# Patient Record
Sex: MALE | Race: BLACK OR AFRICAN AMERICAN | Employment: FULL TIME | URBAN - METROPOLITAN AREA
[De-identification: names, ages, dates, MRNs, and addresses within clinical notes are randomized per-mention and may not be internally consistent; named-entity substitution may affect disease eponyms.]

---

## 2021-09-10 ENCOUNTER — TRANSCRIBE ORDER (OUTPATIENT)
Dept: SCHEDULING | Age: 46
End: 2021-09-10

## 2021-09-10 DIAGNOSIS — M79.641 RIGHT HAND PAIN: Primary | ICD-10-CM

## 2021-09-11 ENCOUNTER — TRANSCRIBE ORDER (OUTPATIENT)
Dept: SCHEDULING | Age: 46
End: 2021-09-11

## 2021-09-11 DIAGNOSIS — M79.641 RIGHT HAND PAIN: Primary | ICD-10-CM

## 2021-09-17 ENCOUNTER — HOSPITAL ENCOUNTER (OUTPATIENT)
Dept: MRI IMAGING | Age: 46
Discharge: HOME | End: 2021-09-17
Payer: OTHER GOVERNMENT

## 2021-09-17 DIAGNOSIS — M79.641 RIGHT HAND PAIN: ICD-10-CM

## 2021-09-17 PROCEDURE — 73218 MRI UPPER EXTREMITY W/O DYE: CPT

## 2021-12-21 ENCOUNTER — TRANSCRIBE ORDER (OUTPATIENT)
Dept: SCHEDULING | Age: 46
End: 2021-12-21

## 2021-12-21 DIAGNOSIS — M22.41 CHONDROMALACIA PATELLAE OF RIGHT KNEE: Primary | ICD-10-CM

## 2022-01-06 ENCOUNTER — HOSPITAL ENCOUNTER (OUTPATIENT)
Dept: MRI IMAGING | Age: 47
Discharge: HOME | End: 2022-01-06
Attending: PHYSICIAN ASSISTANT
Payer: OTHER GOVERNMENT

## 2022-01-06 DIAGNOSIS — M22.41 CHONDROMALACIA PATELLAE OF RIGHT KNEE: ICD-10-CM

## 2022-01-06 PROCEDURE — 73721 MRI JNT OF LWR EXTRE W/O DYE: CPT

## 2022-03-09 NOTE — PROGRESS NOTES
Chhaya Talbot (: 1975) is a 56 y.o. male, patient, here for evaluation of the following chief complaint(s):  Knee Pain (right)       HPI:    He began having increased right knee pain 8 years ago running. The patient gives no detail or description of his discomfort. He does report at least one previous injection in his right knee. The patient did have an MRI performed on his right knee on 2022. The MRI was independently reviewed today. 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 Not on File    No current outpatient medications on file. No current facility-administered medications for this visit. History reviewed. No pertinent past medical history. History reviewed. No pertinent surgical history. History reviewed. No pertinent family history. Social History     Socioeconomic History    Marital status: SINGLE     Spouse name: Not on file    Number of children: Not on file    Years of education: Not on file    Highest education level: Not on file   Occupational History    Not on file   Tobacco Use    Smoking status: Not on file    Smokeless tobacco: Not on file   Substance and Sexual Activity    Alcohol use: Not on file    Drug use: Not on file    Sexual activity: Not on file   Other Topics Concern    Not on file   Social History Narrative    Not on file     Social Determinants of Health     Financial Resource Strain:     Difficulty of Paying Living Expenses: Not on file   Food Insecurity:     Worried About Running Out of Food in the Last Year: Not on file    Saira of Food in the Last Year: Not on file   Transportation Needs:     Lack of Transportation (Medical): Not on file    Lack of Transportation (Non-Medical):  Not on file   Physical Activity:     Days of Exercise per Week: Not on file    Minutes of Exercise per Session: Not on file   Stress:     Feeling of Stress : Not on file   Social Connections:     Frequency of Communication with Friends and Family: Not on file    Frequency of Social Gatherings with Friends and Family: Not on file    Attends Sikh Services: Not on file    Active Member of Clubs or Organizations: Not on file    Attends Club or Organization Meetings: Not on file    Marital Status: Not on file   Intimate Partner Violence:     Fear of Current or Ex-Partner: Not on file    Emotionally Abused: Not on file    Physically Abused: Not on file    Sexually Abused: Not on file   Housing Stability:     Unable to Pay for Housing in the Last Year: Not on file    Number of [place] in the Last Year: Not on file    Unstable Housing in the Last Year: Not on file       Review of Systems   All other systems reviewed and are negative. Vitals:  Ht 5' 6\" (1.676 m)   Wt 148 lb (67.1 kg)   BMI 23.89 kg/m²    Body mass index is 23.89 kg/m². Ortho Exam     The patient is well-developed and well-nourished. The patient presents today in alert and oriented x3 with a normal mood and affect. The patient stands with a normal weightbearing line walks with a slightly antalgic gait because of his right knee pain. Right knee, the patient is nontender to palpation along the medial and lateral joint lines, and has no effusion. They are tender to palpation along the medial and lateral facets of the patella. They have crepitus of the patellofemoral joint with range of motion and discomfort with patella grind testing. The patient has no discomfort with Zach´s maneuvers, and the knee is stable. They have full range of motion. They have 5/5 strength, and are neurovascularly intact distally. There is no erythema, warmth or skin lesions present. ASSESSMENT/PLAN:      1. Chronic pain of right knee  2.  Chondromalacia of trochlea       Below is the assessment and plan developed based on review of pertinent history, physical exam, labs, studies, and medications. We discussed the patient's right knee pain and his signs, symptoms, physical exam, description of his pain, and independently reviewed MRI images and results are consistent with an 11 mm vertical full-thickness fissure in the central trochlea. The possible treatment options were discussed with the patient and because of the several year long duration of his increased pain, no improvement with multiple modalities of conservative management, his physical exam, description of his pain, independently reviewed MRI images and results, and his inability to complete daily living activities without significant discomfort we both decided that surgical intervention was the best treatment plan. The risks and benefits of a right knee arthroscopic exam with trochlear chondroplasty and ERICKSON biopsy procedure were discussed in detail with the patient and he would like to proceed. We will schedule this at his convenience. In the interim, I did encourage him to ice and elevate when possible, modify his activity level based on his right knee pain, and use anti-inflammatory medication when necessary. The patient will also work on range of motion, strengthening, and stretching exercises with an at-home exercise program as pain tolerates. He is to avoid any deep knee bend activities against resistance, squatting, kneeling, stairs, lunging, and high impact loading activities. I we will see him back in the office on an as-needed basis or on the day of his right knee surgery. Return for In the office on an as-needed basis or on the day of his right knee surgery. An electronic signature was used to authenticate this note.   -- Broderick Wade MD

## 2022-03-10 ENCOUNTER — OFFICE VISIT (OUTPATIENT)
Dept: ORTHOPEDIC SURGERY | Age: 47
End: 2022-03-10
Payer: OTHER GOVERNMENT

## 2022-03-10 VITALS — WEIGHT: 148 LBS | BODY MASS INDEX: 23.78 KG/M2 | HEIGHT: 66 IN

## 2022-03-10 DIAGNOSIS — M94.28 CHONDROMALACIA OF TROCHLEA: ICD-10-CM

## 2022-03-10 DIAGNOSIS — G89.29 CHRONIC PAIN OF RIGHT KNEE: Primary | ICD-10-CM

## 2022-03-10 DIAGNOSIS — M25.561 CHRONIC PAIN OF RIGHT KNEE: Primary | ICD-10-CM

## 2022-03-10 PROCEDURE — 99204 OFFICE O/P NEW MOD 45 MIN: CPT | Performed by: ORTHOPAEDIC SURGERY

## 2022-05-25 DIAGNOSIS — M94.28 CHONDROMALACIA OF TROCHLEA: Primary | ICD-10-CM

## 2022-06-14 DIAGNOSIS — G89.29 CHRONIC PAIN OF RIGHT KNEE: ICD-10-CM

## 2022-06-14 DIAGNOSIS — M94.28 CHONDROMALACIA OF TROCHLEA: Primary | ICD-10-CM

## 2022-06-14 DIAGNOSIS — M25.561 CHRONIC PAIN OF RIGHT KNEE: ICD-10-CM

## 2022-06-14 RX ORDER — OXYCODONE AND ACETAMINOPHEN 5; 325 MG/1; MG/1
1 TABLET ORAL
Qty: 30 TABLET | Refills: 0 | Status: SHIPPED | OUTPATIENT
Start: 2022-06-14 | End: 2022-06-21

## 2022-06-22 NOTE — PROGRESS NOTES
Jeimy Rajput (: 1975) is a 56 y.o. male, patient, here for evaluation of the following chief complaint(s):  Knee Pain (right) and Surgical Follow-up (sx 6/15/22)       HPI:    He is now 7 days status post right knee arthroscopic exam with synovectomy. His surgery was performed on 6/15/2022. The patient rates the severity of his postoperative right knee pain as a 9 out of 10. He describes his postoperative right knee pain as throbbing, aching, and constant. He has been experiencing some postoperative swelling, bruising, tingling, and weakness. He has been working on range of motion, strengthening, and stretching exercises with an at-home exercise program.    No Known Allergies    No current outpatient medications on file. No current facility-administered medications for this visit. History reviewed. No pertinent past medical history. History reviewed. No pertinent surgical history. History reviewed. No pertinent family history. Social History     Socioeconomic History    Marital status:      Spouse name: Not on file    Number of children: Not on file    Years of education: Not on file    Highest education level: Not on file   Occupational History    Not on file   Tobacco Use    Smoking status: Not on file    Smokeless tobacco: Not on file   Substance and Sexual Activity    Alcohol use: Not on file    Drug use: Not on file    Sexual activity: Not on file   Other Topics Concern    Not on file   Social History Narrative    Not on file     Social Determinants of Health     Financial Resource Strain:     Difficulty of Paying Living Expenses: Not on file   Food Insecurity:     Worried About Running Out of Food in the Last Year: Not on file    Saira of Food in the Last Year: Not on file   Transportation Needs:     Lack of Transportation (Medical): Not on file    Lack of Transportation (Non-Medical):  Not on file   Physical Activity:     Days of Exercise per Week: Not on file    Minutes of Exercise per Session: Not on file   Stress:     Feeling of Stress : Not on file   Social Connections:     Frequency of Communication with Friends and Family: Not on file    Frequency of Social Gatherings with Friends and Family: Not on file    Attends Protestant Services: Not on file    Active Member of Clubs or Organizations: Not on file    Attends Club or Organization Meetings: Not on file    Marital Status: Not on file   Intimate Partner Violence:     Fear of Current or Ex-Partner: Not on file    Emotionally Abused: Not on file    Physically Abused: Not on file    Sexually Abused: Not on file   Housing Stability:     Unable to Pay for Housing in the Last Year: Not on file    Number of [place] in the Last Year: Not on file    Unstable Housing in the Last Year: Not on file       Review of Systems   All other systems reviewed and are negative. Vitals:  Ht 5' 6\" (1.676 m)   Wt 140 lb (63.5 kg)   BMI 22.60 kg/m²    Body mass index is 22.6 kg/m². Ortho Exam     The patient is well-developed and well-nourished. The patient presents today in alert and oriented x3 with a normal mood and affect. The patient stands with a normal weightbearing line but walks with a slightly antalgic gait because of his postoperative right knee pain. Right knee wounds are clean and dry neurovascular intact. There is some ecchymosis but no erythema. His stitches were removed and his incisions are healing nicely with no sign of irritation or infection and look normal.  He lacks approximately 3 degrees of full extension but can reach full extension when gently pushed. .  There is a mild size postoperative effusion present. His seated nonweightbearing range of motion has improved since his surgical procedure. He has approximately 90 degrees of seated nonweightbearing flexion. There is limitation secondary to his postoperative discomfort and effusion which is normal to be expected. His quadricep strength is improving. He can do a seated straight leg raise without discomfort but there is quadriceps weakness noted compared to normal.  He reports no calf tenderness. His sensations are intact his pulses are 2+. His skin is healing nicely. ASSESSMENT/PLAN:      1. Postoperative pain of right knee  2. Status post arthroscopic surgery of right knee       Below is the assessment and plan developed based on review of pertinent history, physical exam, labs, studies, and medications. **The patient was referred to formal physical therapy. **    We discussed the patient's recent right knee arthroscopic exam with synovectomy. His surgery was performed on 6/15/2022. The patient is now 7 days status postsurgical intervention. He is progressing nicely in the early stages of his recovery and having the appropriate amount of expected postoperative discomfort at this time. We did remove the patient stitches today in the office without complication. His incisions are healing nicely with no sign of irritation or infection and look normal.  He does lack approximately 3 degrees of full extension but can reach full extension when gently pushed. There is a postoperative effusion present which we will continue to monitor. His seated nonweightbearing range of motion and strength are both improving. The patient will continue the postoperative protocol by working on range of motion, strengthening, and stretching exercises at both formal physical therapy and with an at-home excise program as pain tolerates. He will focus on regaining full extension by himself. He may continue to increase activities as tolerated and as discussed today in the office. The patient is to avoid any deep knee bend activities against resistance, squatting, kneeling, stairs, lunging, and high impact loading activities.   I did encourage him to ice and elevate when possible, modify his activity level based on his postoperative right knee pain, monitor his effusion, and use anti-inflammatory medication when necessary. I will see him back in 3 to 4 weeks for reevaluation and further discussion of the postoperative protocol. Return in about 3 weeks (around 7/14/2022) for Reevaluation and further discussion of the postop protocol. An electronic signature was used to authenticate this note.   -- Lujean Lanes, MD

## 2022-06-23 ENCOUNTER — OFFICE VISIT (OUTPATIENT)
Dept: ORTHOPEDIC SURGERY | Age: 47
End: 2022-06-23
Payer: OTHER GOVERNMENT

## 2022-06-23 VITALS — BODY MASS INDEX: 22.5 KG/M2 | WEIGHT: 140 LBS | HEIGHT: 66 IN

## 2022-06-23 DIAGNOSIS — G89.18 POSTOPERATIVE PAIN OF RIGHT KNEE: Primary | ICD-10-CM

## 2022-06-23 DIAGNOSIS — Z98.890 STATUS POST ARTHROSCOPIC SURGERY OF RIGHT KNEE: ICD-10-CM

## 2022-06-23 DIAGNOSIS — M25.561 POSTOPERATIVE PAIN OF RIGHT KNEE: Primary | ICD-10-CM

## 2022-06-23 PROCEDURE — 99024 POSTOP FOLLOW-UP VISIT: CPT | Performed by: ORTHOPAEDIC SURGERY

## 2022-06-29 ENCOUNTER — OFFICE VISIT (OUTPATIENT)
Dept: ORTHOPEDIC SURGERY | Age: 47
End: 2022-06-29
Payer: OTHER GOVERNMENT

## 2022-06-29 DIAGNOSIS — M25.061 HEMARTHROSIS OF RIGHT KNEE: ICD-10-CM

## 2022-06-29 DIAGNOSIS — Z98.890 S/P RIGHT KNEE ARTHROSCOPY: Primary | ICD-10-CM

## 2022-06-29 PROCEDURE — 99024 POSTOP FOLLOW-UP VISIT: CPT | Performed by: ORTHOPAEDIC SURGERY

## 2022-06-29 PROCEDURE — 20610 DRAIN/INJ JOINT/BURSA W/O US: CPT | Performed by: ORTHOPAEDIC SURGERY

## 2022-06-29 NOTE — PROGRESS NOTES
Rm Cruz (: 1975) is a 56 y.o. male, patient, here for evaluation of the following chief complaint(s):  Knee Pain (right knee pain)       HPI:    Patient comes in today for evaluation of his right knee. He underwent a right knee arthroscopic synovectomy with Dr. Jamaal Aguayo on 6/15/2022. He was last seen by Dr. Jamaal Aguayo at his first postop visit on 2022. At that time he was noted to have some swelling and discomfort. He also had limited range of motion due to his effusion. His sutures were removed. There is no evidence of infection. He was given a prescription to start physical therapy today. Unfortunately he is continued having discomfort and swelling in his knee. He has pain with any attempts of motion of his knee. He is not endorsing any pain in his calf. He is not noticed any redness or swelling. He does occasionally get some tingling down his leg but he believes this is positional because when he moves it goes away. He denies any new injuries since his surgery. No Known Allergies    No current outpatient medications on file. No current facility-administered medications for this visit. No past medical history on file. No past surgical history on file. No family history on file.      Social History     Socioeconomic History    Marital status:      Spouse name: Not on file    Number of children: Not on file    Years of education: Not on file    Highest education level: Not on file   Occupational History    Not on file   Tobacco Use    Smoking status: Not on file    Smokeless tobacco: Not on file   Substance and Sexual Activity    Alcohol use: Not on file    Drug use: Not on file    Sexual activity: Not on file   Other Topics Concern    Not on file   Social History Narrative    Not on file     Social Determinants of Health     Financial Resource Strain:     Difficulty of Paying Living Expenses: Not on file   Food Insecurity:     Worried About Running Out of Food in the Last Year: Not on file    Ran Out of Food in the Last Year: Not on file   Transportation Needs:     Lack of Transportation (Medical): Not on file    Lack of Transportation (Non-Medical): Not on file   Physical Activity:     Days of Exercise per Week: Not on file    Minutes of Exercise per Session: Not on file   Stress:     Feeling of Stress : Not on file   Social Connections:     Frequency of Communication with Friends and Family: Not on file    Frequency of Social Gatherings with Friends and Family: Not on file    Attends Mormonism Services: Not on file    Active Member of Clubs or Organizations: Not on file    Attends Club or Organization Meetings: Not on file    Marital Status: Not on file   Intimate Partner Violence:     Fear of Current or Ex-Partner: Not on file    Emotionally Abused: Not on file    Physically Abused: Not on file    Sexually Abused: Not on file   Housing Stability:     Unable to Pay for Housing in the Last Year: Not on file    Number of [place] in the Last Year: Not on file    Unstable Housing in the Last Year: Not on file       Review of Systems   Musculoskeletal:        Right knee pain       Vitals: There were no vitals taken for this visit. There is no height or weight on file to calculate BMI. Ortho Exam     General:  Well-nourished well-developed male. Alert and oriented. No acute distress. Pleasant on exam.  Normal affect and mood. Antalgic gait and using crutches to ambulate. Right lower extremity:  Skin is intact about the knee. Portal sites are healed without evidence of dehiscence or drainage. There is a large tense effusion noted. Ecchymosis along the medial aspect of his knee and around the middle third of his thigh. He holds the knee in about 45 degrees of flexion. He has pain with any attempts at extension of the knee actively or passively. Passively he lacks about 15 degrees in full extension.   He is able to actively flex the knee to about 80 degrees again with discomfort. Motor and sensory are intact distally in all distributions. Palpable DP PT pulse. Foot is warm well perfused. The calf is supple and nontender to compression. No pain in the calf with passive range of motion of the toes. Thigh is soft and compressible. ASSESSMENT/PLAN:    Long discussion was had with the patient today regarding his ongoing postoperative course. His operative report was reviewed with him. At this point it appears he has a postoperative hemarthrosis. Do not suspect any type of infection at this point. Explained to the patient that after surgery sometimes patients can get blood within the joint which can cause swelling and discomfort and also make range of motion difficult. We discussed potential treatment options including seeing if we can get this to further resolve with more time however it is interfering with his ability to participate in physical therapy. Therefore recommended aspiration of the joint. The procedure, risk, benefits, alternatives, and expectations were discussed in detail with the patient. All questions were answered. After consent, the right knee was prepped. The right knee was aspirated using aseptic technique. 65 cc of blood-tinged fluid was drained out of the knee. He tolerated the procedure well. Encouraged him continue to ice and elevate the knee. He can use a compression sleeve or wrap to help with swelling. He will follow-up with Dr. Brayan Pond as scheduled. He is encouraged to call or come back sooner with any other questions or concerns.         Anali Gaspar MD

## 2022-06-29 NOTE — LETTER
6/29/2022    Patient: Jaquan Borrero   YOB: 1975   Date of Visit: 6/29/2022     Avila Barajas NP  [facility] [phone] [id number]  Via Fax: [phone]    Dear Avila Barajas NP,      Thank you for referring Mr. Jaquan Borrero to [facility] for evaluation. My notes for this consultation are attached. If you have questions, please do not hesitate to call me. I look forward to following your patient along with you.       Sincerely,    Jonathan Cid MD

## 2022-08-29 ENCOUNTER — OFFICE VISIT (OUTPATIENT)
Dept: ORTHOPEDIC SURGERY | Age: 47
End: 2022-08-29
Payer: OTHER GOVERNMENT

## 2022-08-29 VITALS — WEIGHT: 140 LBS | HEIGHT: 68 IN | BODY MASS INDEX: 21.22 KG/M2

## 2022-08-29 DIAGNOSIS — Z98.890 S/P RIGHT KNEE ARTHROSCOPY: ICD-10-CM

## 2022-08-29 DIAGNOSIS — M25.561 POSTOPERATIVE PAIN OF RIGHT KNEE: Primary | ICD-10-CM

## 2022-08-29 DIAGNOSIS — G89.18 POSTOPERATIVE PAIN OF RIGHT KNEE: Primary | ICD-10-CM

## 2022-08-29 DIAGNOSIS — M25.461 KNEE EFFUSION, RIGHT: ICD-10-CM

## 2022-08-29 PROCEDURE — 20610 DRAIN/INJ JOINT/BURSA W/O US: CPT | Performed by: ORTHOPAEDIC SURGERY

## 2022-08-29 PROCEDURE — 99024 POSTOP FOLLOW-UP VISIT: CPT | Performed by: ORTHOPAEDIC SURGERY

## 2022-08-29 RX ORDER — TRIAMCINOLONE ACETONIDE 40 MG/ML
80 INJECTION, SUSPENSION INTRA-ARTICULAR; INTRAMUSCULAR ONCE
Status: COMPLETED | OUTPATIENT
Start: 2022-08-29 | End: 2022-08-29

## 2022-08-29 RX ORDER — BUPIVACAINE HYDROCHLORIDE 7.5 MG/ML
2 INJECTION, SOLUTION EPIDURAL; RETROBULBAR ONCE
Status: COMPLETED | OUTPATIENT
Start: 2022-08-29 | End: 2022-08-29

## 2022-08-29 RX ADMIN — BUPIVACAINE HYDROCHLORIDE 15 MG: 7.5 INJECTION, SOLUTION EPIDURAL; RETROBULBAR at 11:56

## 2022-08-29 RX ADMIN — TRIAMCINOLONE ACETONIDE 80 MG: 40 INJECTION, SUSPENSION INTRA-ARTICULAR; INTRAMUSCULAR at 11:56

## 2022-08-29 NOTE — PROGRESS NOTES
Buffy Salazar (: 1975) is a 56 y.o. male, patient, here for evaluation of the following chief complaint(s):  Knee Pain (right) and Surgical Follow-up (Sx 6/15/22)       HPI:    He has now approaching 11 weeks status post right knee arthroscopic exam with synovectomy. His surgery was performed on 6/15/2022. He was last seen on 2022. The patient states his pain levels gotten worse since his last visit. He rates the severity of his postoperative radiation is a 4 out of 10. He describes pain as sharp, throbbing, aching, and intermittent. He reports taking no medication for his discomfort. He has been experiencing some postoperative swelling. The patient has been attending formal physical therapy postoperatively and reports that the formal PT has helped reduce his postoperative right knee pain. No Known Allergies    No current outpatient medications on file. No current facility-administered medications for this visit. History reviewed. No pertinent past medical history. History reviewed. No pertinent surgical history. History reviewed. No pertinent family history.      Social History     Socioeconomic History    Marital status:      Spouse name: Not on file    Number of children: Not on file    Years of education: Not on file    Highest education level: Not on file   Occupational History    Not on file   Tobacco Use    Smoking status: Not on file    Smokeless tobacco: Not on file   Substance and Sexual Activity    Alcohol use: Not on file    Drug use: Not on file    Sexual activity: Not on file   Other Topics Concern    Not on file   Social History Narrative    Not on file     Social Determinants of Health     Financial Resource Strain: Not on file   Food Insecurity: Not on file   Transportation Needs: Not on file   Physical Activity: Not on file   Stress: Not on file   Social Connections: Not on file   Intimate Partner Violence: Not on file   Housing Stability: Not on file Review of Systems   All other systems reviewed and are negative. Vitals:  Ht 5' 8\" (1.727 m)   Wt 140 lb (63.5 kg)   BMI 21.29 kg/m²    Body mass index is 21.29 kg/m². Ortho Exam     The patient is well-developed and well-nourished. The patient presents today in alert and oriented x3 with a normal mood and affect. The patient stands with a normal weightbearing line but walks with a slightly antalgic gait because of his postoperative left knee pain. Left knee wounds clean and dry neurovascular intact. There is no ecchymosis or erythema. His incisions are well-healed with no sign of irritation or infection and look normal.  He does have full extension. He has essentially full range of motion. There is a mild size postoperative effusion. There is some slight limitation in terminal flexion secondary to his postoperative discomfort and mild size effusion. His quadricep strength is well-maintained. He can easily do a seated straight leg raise with discomfort. He reports no calf tenderness. His sensations are intact and his pulses are 2+. His skin is well-healed. ASSESSMENT/PLAN:      1. Postoperative pain of right knee  2. S/P right knee arthroscopy  -     REFERRAL TO PHYSICAL THERAPY  3. Knee effusion, right  -     bupivacaine (PF) (MARCAINE) 0.75 % (7.5 mg/mL) injection 15 mg; 15 mg (2 mL), Intra artICUlar, ONCE, 1 dose, On Mon 8/29/22 at 1200  -     triamcinolone acetonide (KENALOG-40) 40 mg/mL injection 80 mg; 80 mg, Intra artICUlar, ONCE, 1 dose, On Mon 8/29/22 at 1200     Below is the assessment and plan developed based on review of pertinent history, physical exam, labs, studies, and medications. **The patient will continue attending formal physical therapy as needed. **    We discussed the patient's right knee arthroscopic exam with synovectomy. His surgery was performed on 6/15/2022. He is now approaching 11 weeks status postsurgical intervention.   The patient had been progressing nicely in his postoperative recovery but does present today with slightly more discomfort than he had at his last visit. He does present today with a mild size effusion present. The possible treatment options were discussed with the patient and because of an effusion present we elected to aspirate and inject his right knee with cortisone today to try and alleviate some of his discomfort. The risks and benefits of the aspiration and injection were discussed in detail with the patient and under sterile prep the patient's right knee was aspirated of approximately 12 ccs of straw-colored synovial fluid and injected with 2 ccs of 40 mg/cc of Kenalog and 2 ccs of 0.75% Sensorcaine. He tolerated both the aspiration and injection well. I did encourage him to continue to ice and elevate when possible, modify his activity level based on his right knee pain, monitor his effusion, and use anti-inflammatory medication when necessary. The patient will continue to work on range of motion, strengthening, and stretching exercises with both formal physical therapy and with an at-home exercise program as pain tolerates. He is still to avoid any deep knee bend activities against resistance, squatting, kneeling, stairs, lunging, and high impact loading activities. I will see him back in 4 weeks for reevaluation and further discussion of the postoperative protocol. Return in about 4 weeks (around 9/26/2022) for Reevaluation and further discussion of the postop protocol. An electronic signature was used to authenticate this note.   -- Sierra Davidson MD